# Patient Record
Sex: MALE | Race: WHITE
[De-identification: names, ages, dates, MRNs, and addresses within clinical notes are randomized per-mention and may not be internally consistent; named-entity substitution may affect disease eponyms.]

---

## 2019-06-10 ENCOUNTER — HOSPITAL ENCOUNTER (OUTPATIENT)
Dept: HOSPITAL 41 - JD.SDS | Age: 29
End: 2019-06-10
Attending: SURGERY
Payer: COMMERCIAL

## 2019-06-10 DIAGNOSIS — K35.80: Primary | ICD-10-CM

## 2019-06-10 DIAGNOSIS — Z88.0: ICD-10-CM

## 2019-06-10 PROCEDURE — 44970 LAPAROSCOPY APPENDECTOMY: CPT

## 2019-06-10 NOTE — PCM.POSTAN
POST ANESTHESIA ASSESSMENT





- MENTAL STATUS


Mental Status: Alert, Oriented





- VITAL SIGNS


Pulse Rate: 84


SaO2: 98


Resp Rate: 14


Blood Pressure: 165/108


Temperature: 37.0 C





- RESPIRATORY


Respiratory Status: Respiratory Rate WNL, Airway Patent, O2 Saturation Stable





- CARDIOVASCULAR


CV Status: Elevated Blood Pressure





- GASTROINTESTINAL


GI Status: No Symptoms





- PAIN


Pain Score: 0





- POST OP HYDRATION


Hydration Status: Adequate & Stable

## 2019-06-10 NOTE — PCM.PREANE
Preanesthetic Assessment





- Anesthesia/Transfusion/Family Hx


Anesthesia History: Prior Anesthesia Reaction


Type of Anesthesia Reaction: Excessive Nausea/Vomiting (will use scopalomine 

patch )


Family History of Anesthesia Reaction: No


Transfusion History: No Prior Transfusion(s)





- Review of Systems


General: No Symptoms


Pulmonary: No Symptoms


Cardiovascular: No Symptoms


Gastrointestinal: Abdominal Pain ((R) sided, rates 3 on pain scale)


Neurological: No Symptoms


Other: Reports: None





- Physical Assessment


NPO Status Date: 06/10/19


NPO Status Time: 08:00


Pulse: 70


O2 Sat by Pulse Oximetry: 97


Respiratory Rate: 16


Blood Pressure: 127/89


Temperature: 98.4 C


ASA Class: 1


Mental Status: Alert & Oriented x3


Airway Class: Mallampati = 1


Dentition: Reports: Normal Dentition


Thyro-Mental Finger Breadths: 3


Mouth Opening Finger Breadths: 3


ROM/Head Extension: Full


Lungs: Clear to Auscultation, Normal Respiratory Effort


Cardiovascular: Regular Rate, Regular Rhythm





- Allergies


Allergies/Adverse Reactions: 


 Allergies











Allergy/AdvReac Type Severity Reaction Status Date / Time


 


amoxicillin Allergy  Hives Verified 06/10/19 13:50














- Acknowledgements


Anesthesia Type Planned: General Anesthesia


Pt an Appropriate Candidate for the Planned Anesthesia: Yes


Alternatives and Risks of Anesthesia Discussed w Pt/Guardian: Yes


Pt/Guardian Understands and Agrees with Anesthesia Plan: Yes





PreAnesthesia Questionnaire


HEENT History: Reports: None


Cardiovascular History: Reports: None


Respiratory History: Reports: None


Gastrointestinal History: Reports: None


Genitourinary History: Reports: None


Musculoskeletal History: Reports: None


Neurological History: Reports: None


Psychiatric History: Reports: None


Endocrine/Metabolic History: Reports: None


Hematologic History: Reports: None


Immunologic History: Reports: None


Dermatologic History: Reports: None





- Infectious Disease History


Infectious Disease History: Reports: None





- Past Surgical History


Head Surgeries/Procedures: Reports: None


HEENT Surgical History: Reports: None


Cardiovascular Surgical History: Reports: None


Respiratory Surgical History: Reports: None


GI Surgical History: Reports: None


Male  Surgical History: Reports: None


Endocrine Surgical History: Reports: None


Musculoskeletal Surgical History: Reports: Shoulder Replacement (arthroscopic (L

))





- SUBSTANCE USE


Smoking Status *Q: Never Smoker





- CURRENT (IN HOUSE) MEDS


Current Meds: 





 Current Medications





Lactated Ringer's (Ringers, Lactated)  1,000 mls @ 125 mls/hr IV ASDIRECTED TUAN


   Stop: 06/10/19 23:00


Clindamycin Phosphate 900 mg/ (Sodium Chloride)  106 mls @ 200 mls/hr IV 

ONETIME TUAN


Lidocaine/Sodium Bicarbonate (Buffered Lidocaine 1% In Ns 8.4%)  0.25 ml IDERM 

ONETIME PRN


   PRN Reason: Prior to IV Start


   Stop: 06/10/19 18:00


Sodium Chloride (Saline Flush)  10 ml FLUSH ASDIRECTED PRN


   PRN Reason: Keep Vein Open


   Stop: 06/10/19 20:00





Discontinued Medications





Fentanyl (Sublimaze) Confirm Administered Dose 250 mcg .ROUTE .STK-MED ONE


   Stop: 06/10/19 13:43


Lidocaine HCl (Xylocaine-Mpf 1%) Confirm Administered Dose 2 mls @ as directed 

.ROUTE .STK-MED ONE


   Stop: 06/10/19 13:42


Lidocaine HCl (Xylocaine-Mpf 1%) Confirm Administered Dose 2 mls @ as directed 

.ROUTE .STK-MED ONE


   Stop: 06/10/19 13:42


Lidocaine HCl (Xylocaine-Mpf 1%) Confirm Administered Dose 30 ml .ROUTE .STK-

MED ONE


   Stop: 06/10/19 13:39


Midazolam HCl (Versed 1 Mg/Ml) Confirm Administered Dose 2 mg .ROUTE .STK-MED 

ONE


   Stop: 06/10/19 13:42


Propofol (Diprivan  20 Ml) Confirm Administered Dose 200 mg .ROUTE .STK-MED ONE


   Stop: 06/10/19 13:41


Rocuronium Bromide (Zemuron) Confirm Administered Dose 50 mg .ROUTE .STK-MED ONE


   Stop: 06/10/19 13:43

## 2019-06-11 NOTE — OR
DATE OF OPERATION:  06/10/2019

 

SURGEON:  Gretta Doe MD

 

PREOPERATIVE DIAGNOSIS:

Acute appendicitis.

 

POSTOPERATIVE DIAGNOSIS:

Acute retrocecal appendicitis.

 

OPERATION PERFORMED:  Laparoscopic appendectomy.

 

ANESTHESIA:

General with intubation.

 

ESTIMATED BLOOD LOSS:

Less than 5 mL.

 

REPLACEMENT:

Crystalloid.

 

DRAINS:

None.

 

BRIEF HISTORY:

This is a previously healthy 29-year-old male with CT scan proven acute

appendicitis.  I had the opportunity to discuss risks and benefits with the

patient preoperatively, and he agreed to proceed.

 

DESCRIPTION OF PROCEDURE:

The patient was taken to the operating room.  Time-out was performed.  At this

point, I did a small amount of shaving on the infraumbilical in the suprapubic

area.  General anesthesia was obtained with intubation.  The abdomen was prepped

and draped in the usual fashion, and we waited the 3 minutes for the prep to

dry.  1% lidocaine was instilled in the subcutaneous tissue in the

infraumbilical area.  Dissection was performed to the skin and subcutaneous

tissue until I identified the fascia.  This was grasped between 2 sterile

hemostats and elevated superiorly.  A nick was made in the fascia and a Veress

needle was advanced for CO2 insufflation.  Once this was done to my

satisfaction, a 5 mm port was advanced.

 

The camera confirmed good positioning.  A 5 was advanced in the right upper

quadrant and suprapubic 12 was advanced directly.  At this point, I was able to

dissect free and find that the appendix tip was very distended and that the base

was quite normal.  It actually was retrocecal and pointing towards the hepatic

flexure.  I was able to make a window at the base of the appendix and fired the

stapling device.  I then divided the mesoappendiceal vessels using multiple

hemoclips.  Once I freed the appendix, I was able to place it in Endobag.  This

was then brought out through the suprapubic 12 incision.  I then irrigated with

saline, could see that hemostasis was very good.  The staple line was intact.

 

I placed the omentum over the area and then irrigated out the fluid.

 

The 12 mm port was inspected.  There was no bleeding.  I removed the 5 right

upper quadrant one under direct vision.  There was no bleeding.  The camera was

removed.  0-PDS was used for reapproximation of the fascia of the 12 in a

continuous running fashion for 3 swipes basically and a single suture of the 0-

PDS for the infraumbilical 5.  Subcuticular closure was achieved.

 

Benzoin and Steri-Strips applied.

 

The plan will be that this patient will be discharged here today, and we will

give 1 more dose of antibiotics to take at home.

 

I would like him to follow up here on Friday, and then we will be able to give

him the instructions for whether he goes back to work or not.

 

He tolerated the procedure well.

 

DD:  06/10/2019 15:17:12

DT:  06/10/2019 22:03:09  SHARLA

Job #:  [id number]

## 2019-06-11 NOTE — HP
DATE OF ADMISSION:  06/10/2019

 

CHIEF COMPLAINT:

Right lower quadrant pain.

 

HISTORY OF PRESENT ILLNESS:

Mr. Cao is a most pleasant 29-year-old male, who has had 3 days of kind of a

vague lower abdominal pain.  He states it was such that he really just did not

feel like eating and the pain was made worse when he was walking.  He had no

associated diarrhea or constipation and he actually had no history of hematuria

or dysuria.  He was initially seen yesterday at an outlying facility where his

WBC was normal and essentially was sent home.  He stated the pain continued to

escalate to the point where now it is really localized in the right lower

quadrant.

 

He has never had this problem before and has no history of any abdominal

problems.

 

He subsequently had a CT scan of the abdomen and pelvis, which is consistent

with an acute appendicitis which is not ruptured.

 

PAST MEDICAL HISTORY:

 

 

ALLERGIES:

Amoxicillin.

 

PAST SURGICAL HISTORY:

Left shoulder.

 

CURRENT MEDICATIONS:

None

 

SOCIAL HISTORY:

Smoking negative.  Alcohol socially.  He is actively engaged in welding.  He is

gainfully employed and he is from [place].

 

FAMILY HISTORY:

He has 1 brother, alive and well.

 

REVIEW OF SYSTEMS:

No seizures or strokes.  No blurred or double vision.  No thyroid, diabetes,

hepatitis.  No shortness of breath or cough.  No palpitations.  No prior

abdominal pain or tenderness.

 

No hematuria or dysuria.  No history of bruising or bleeding disorders.

 

PHYSICAL EXAMINATION:

GENERAL:  This is a young 29-year-old male.

HEENT:  Pupils are equal.

HEENT:  Sclerae white.

NECK:  Without mass.

LUNGS:  Clear.

HEART:  Rhythm is regular.

ABDOMEN:  There is marked tenderness with deep palpation and guarding in the

right lower quadrant.

EXTREMITIES:  Ankles are free of edema.

 

RADIOGRAPHIC STUDIES:

CT scan report has been reviewed.

 

ASSESSMENT:

1. Indeed, this patient has all the signs and symptoms of acute appendicitis.

    I clearly discussed the risks and benefits of an attempt at laparoscopic

    appendectomy to include, but not limited to bleeding, infection, heart

    attack, death, injury to structures not intended, the need for even a

    possible open procedure, or even need for a drain.  Even with these risks,

    he wishes to proceed and does not wish for another consult.  I also

    explained to him that pending on what we find will be determining whether

    we can let him go home today or not, and then we will be able to give

    further instructions.

2. Status post shoulder surgery.  Of note, the patient states he has not eaten

    since yesterday.

 

DD:  06/10/2019 13:57:20

DT:  06/10/2019 20:51:15  MMODAL

Job #:  [id number]